# Patient Record
Sex: FEMALE | URBAN - METROPOLITAN AREA
[De-identification: names, ages, dates, MRNs, and addresses within clinical notes are randomized per-mention and may not be internally consistent; named-entity substitution may affect disease eponyms.]

---

## 2019-08-22 ENCOUNTER — TRANSFERRED RECORDS (OUTPATIENT)
Dept: HEALTH INFORMATION MANAGEMENT | Facility: CLINIC | Age: 39
End: 2019-08-22

## 2019-10-31 ENCOUNTER — VIRTUAL VISIT (OUTPATIENT)
Dept: FAMILY MEDICINE | Facility: OTHER | Age: 39
End: 2019-10-31

## 2019-11-01 NOTE — PROGRESS NOTES
"Date: 10/31/2019 17:30:03  Clinician: Mark Monaco  Clinician NPI: [id number]  Patient: Winston Ramos  Patient : 1980  Patient Address: [address] [place] [id number]  Patient Phone: [phone]  Visit Protocol: Eye conditions  Patient Summary:  Winston is a 39 year old (: 1980 ) female who initiated a Visit for stye.  When asked the question \"Please sign me up to receive news, health information and promotions from [employer].\", Winston responded \"No\".    Images of her eye condition were uploaded.   Her symptoms started more than 2 weeks ago and affect the right eye. The symptoms consist of eyelid swelling, itchy eye(s), and bump(s) on the eyelid.   Symptom details     Itchiness: Winston does not have seasonal allergies or hay fever.    Eyelid bump(s): Winston has 1 bump on her eyelid. The bump(s) on her eyelid is tender.     Denied symptoms include eye redness, light sensitivity, eye pain, and drainage coming from the eye(s). Winston does not have subconjunctival hemorrhage and has not experienced a decrease in vision. She does not feel feverish.   Precipitating events  Winston wears contact lenses. She has not slept in them in the past week.   She has not had a recent cold or ear infection, eye surgery, foreign body in the eye(s), and eye injury.   Pertinent medical history  Winston has not ever been diagnosed with glaucoma.   Winston has been using erythromycin ophthalmic ointment to treat her current symptoms.   Medication efficacy as reported by the patient (free text): It got better for 3 days, then now it's worse   Winston does not require proof of evaluation of her eye condition before returning to school, work, or .   Winston does not smoke or use smokeless tobacco.   She denies pregnancy and denies breastfeeding. She does not menstruate.   Additional information as reported by the patient (free text): It looks like a small cut on my eyelid at the crease. I saw a doctor 2 weeks ago but the med she " prescribed has not improved the itchiness and pain from my eye.     MEDICATIONS: Aspir-Low oral, Adderall oral, Wellbutrin XL oral, Abilify oral, ALLERGIES: NKDA  Clinician Response:  Dear Winston,  I am sorry you are not feeling well. Your health is our priority. To determine the most appropriate care for you, I would like you to be seen in person to further discuss your health history and symptoms.  You will not be charged for this Visit. Thank you for trusting us with your care.   Diagnosis: Refer for additional evaluation  Diagnosis ICD: R69  Additional Clinician Notes: This does not look like a stye to me. I would recommend that you check back in with that doctor who saw you or your primary or even better your eye doctor.